# Patient Record
Sex: MALE | Race: WHITE | NOT HISPANIC OR LATINO | Employment: FULL TIME | URBAN - NONMETROPOLITAN AREA
[De-identification: names, ages, dates, MRNs, and addresses within clinical notes are randomized per-mention and may not be internally consistent; named-entity substitution may affect disease eponyms.]

---

## 2023-06-13 DIAGNOSIS — K21.9 GASTROESOPHAGEAL REFLUX DISEASE WITHOUT ESOPHAGITIS: Primary | ICD-10-CM

## 2023-06-13 RX ORDER — OMEPRAZOLE 40 MG/1
1 CAPSULE, DELAYED RELEASE ORAL DAILY
COMMUNITY
Start: 2020-03-04 | End: 2023-06-13 | Stop reason: SDUPTHER

## 2023-06-13 RX ORDER — OMEPRAZOLE 40 MG/1
40 CAPSULE, DELAYED RELEASE ORAL DAILY
Qty: 90 CAPSULE | Refills: 1 | Status: SHIPPED | OUTPATIENT
Start: 2023-06-13 | End: 2023-11-22

## 2023-07-25 ENCOUNTER — OFFICE VISIT (OUTPATIENT)
Dept: PRIMARY CARE | Facility: CLINIC | Age: 23
End: 2023-07-25
Payer: COMMERCIAL

## 2023-07-25 VITALS
OXYGEN SATURATION: 96 % | HEART RATE: 72 BPM | RESPIRATION RATE: 14 BRPM | SYSTOLIC BLOOD PRESSURE: 117 MMHG | DIASTOLIC BLOOD PRESSURE: 67 MMHG | BODY MASS INDEX: 27.94 KG/M2 | HEIGHT: 72 IN | TEMPERATURE: 97.8 F | WEIGHT: 206.3 LBS

## 2023-07-25 DIAGNOSIS — F84.0 AUTISM SPECTRUM DISORDER (HHS-HCC): ICD-10-CM

## 2023-07-25 DIAGNOSIS — F90.2 ATTENTION DEFICIT HYPERACTIVITY DISORDER (ADHD), COMBINED TYPE: ICD-10-CM

## 2023-07-25 DIAGNOSIS — J30.89 SEASONAL ALLERGIC RHINITIS DUE TO OTHER ALLERGIC TRIGGER: ICD-10-CM

## 2023-07-25 DIAGNOSIS — Z11.59 NEED FOR HEPATITIS C SCREENING TEST: ICD-10-CM

## 2023-07-25 DIAGNOSIS — K21.00 GASTROESOPHAGEAL REFLUX DISEASE WITH ESOPHAGITIS WITHOUT HEMORRHAGE: ICD-10-CM

## 2023-07-25 DIAGNOSIS — J45.40 MODERATE PERSISTENT ASTHMA WITHOUT COMPLICATION (HHS-HCC): ICD-10-CM

## 2023-07-25 DIAGNOSIS — Z11.4 ENCOUNTER FOR SCREENING FOR HIV: ICD-10-CM

## 2023-07-25 DIAGNOSIS — F41.9 ANXIETY: ICD-10-CM

## 2023-07-25 DIAGNOSIS — F95.9 TIC DISORDER: ICD-10-CM

## 2023-07-25 DIAGNOSIS — E78.2 MIXED HYPERLIPIDEMIA: ICD-10-CM

## 2023-07-25 DIAGNOSIS — Z00.00 WELLNESS EXAMINATION: Primary | ICD-10-CM

## 2023-07-25 DIAGNOSIS — G43.909 MIGRAINE WITHOUT STATUS MIGRAINOSUS, NOT INTRACTABLE, UNSPECIFIED MIGRAINE TYPE: ICD-10-CM

## 2023-07-25 PROBLEM — D68.51 HETEROZYGOUS FACTOR V LEIDEN MUTATION (MULTI): Status: ACTIVE | Noted: 2023-07-25

## 2023-07-25 PROBLEM — M41.9 SCOLIOSIS: Status: ACTIVE | Noted: 2023-07-25

## 2023-07-25 PROBLEM — E78.5 HYPERLIPIDEMIA: Status: ACTIVE | Noted: 2023-07-25

## 2023-07-25 PROBLEM — S61.219A FINGER LACERATION: Status: RESOLVED | Noted: 2023-07-25 | Resolved: 2023-07-25

## 2023-07-25 PROBLEM — L70.0 SUPERFICIAL ACNE VULGARIS: Status: ACTIVE | Noted: 2023-07-25

## 2023-07-25 PROBLEM — S61.219A FINGER LACERATION: Status: ACTIVE | Noted: 2023-07-25

## 2023-07-25 PROCEDURE — 1036F TOBACCO NON-USER: CPT | Performed by: FAMILY MEDICINE

## 2023-07-25 PROCEDURE — 99395 PREV VISIT EST AGE 18-39: CPT | Performed by: FAMILY MEDICINE

## 2023-07-25 RX ORDER — LEVOCETIRIZINE DIHYDROCHLORIDE 5 MG/1
5 TABLET, FILM COATED ORAL NIGHTLY
COMMUNITY

## 2023-07-25 RX ORDER — BUDESONIDE AND FORMOTEROL FUMARATE DIHYDRATE 80; 4.5 UG/1; UG/1
AEROSOL RESPIRATORY (INHALATION)
COMMUNITY
Start: 2021-04-19

## 2023-07-25 RX ORDER — LISDEXAMFETAMINE DIMESYLATE 70 MG/1
1 CAPSULE ORAL DAILY
COMMUNITY
Start: 2023-07-23 | End: 2024-01-04 | Stop reason: SDUPTHER

## 2023-07-25 RX ORDER — MONTELUKAST SODIUM 10 MG/1
10 TABLET ORAL DAILY
Qty: 30 TABLET | Refills: 11 | Status: SHIPPED | OUTPATIENT
Start: 2023-07-25

## 2023-07-25 RX ORDER — MAGNESIUM GLUCONATE 27 MG(500)
1 TABLET ORAL NIGHTLY
COMMUNITY
Start: 2017-08-14

## 2023-07-25 RX ORDER — EPINEPHRINE 0.3 MG/.3ML
INJECTION SUBCUTANEOUS
COMMUNITY
Start: 2017-05-30

## 2023-07-25 RX ORDER — ALBUTEROL SULFATE 90 UG/1
2 AEROSOL, METERED RESPIRATORY (INHALATION) EVERY 6 HOURS PRN
Qty: 18 G | Refills: 1 | Status: SHIPPED | OUTPATIENT
Start: 2023-07-25

## 2023-07-25 RX ORDER — SERTRALINE HYDROCHLORIDE 100 MG/1
300 TABLET, FILM COATED ORAL DAILY
COMMUNITY
End: 2024-01-10 | Stop reason: SDUPTHER

## 2023-07-25 RX ORDER — FAMOTIDINE 20 MG/1
20 TABLET, FILM COATED ORAL 2 TIMES DAILY
COMMUNITY
Start: 2017-09-12

## 2023-07-25 RX ORDER — CYPROHEPTADINE HYDROCHLORIDE 4 MG/1
4 TABLET ORAL 2 TIMES DAILY
Qty: 60 TABLET | Refills: 11 | Status: SHIPPED | OUTPATIENT
Start: 2023-07-25

## 2023-07-25 RX ORDER — LISDEXAMFETAMINE DIMESYLATE 50 MG/1
CAPSULE ORAL
COMMUNITY
Start: 2017-09-11 | End: 2024-01-10 | Stop reason: SDUPTHER

## 2023-07-25 RX ORDER — ALBUTEROL SULFATE 90 UG/1
AEROSOL, METERED RESPIRATORY (INHALATION)
COMMUNITY
Start: 2015-05-28 | End: 2023-07-25 | Stop reason: SDUPTHER

## 2023-07-25 RX ORDER — CYPROHEPTADINE HYDROCHLORIDE 4 MG/1
8 TABLET ORAL 2 TIMES DAILY
COMMUNITY
End: 2023-07-25 | Stop reason: SDUPTHER

## 2023-07-25 RX ORDER — CLONIDINE HYDROCHLORIDE 0.2 MG/1
0.2 TABLET ORAL 2 TIMES DAILY
COMMUNITY
End: 2024-03-28 | Stop reason: SDUPTHER

## 2023-07-25 RX ORDER — MONTELUKAST SODIUM 10 MG/1
TABLET ORAL
COMMUNITY
Start: 2017-08-29 | End: 2023-07-25 | Stop reason: SDUPTHER

## 2023-07-25 NOTE — PROGRESS NOTES
Subjective   Patient ID: Luis Armas is a 22 y.o. male who presents for annual physical/wellness visit.    He signed document informing that if problem issues also address at today's wellness visit that insurance may be appropriately billed so co-pay and deductible out of pocket expenses may occur.      Tdap: 06/29/2022  HIV screen:  Hepatitis C screen:  Hepatitis B vaccine:    Psychologist is leaving practice and advised mom PCP can take over meds.     Mom will be bringing back forms for [employer] .      HPI   Mother states that his current psychiatrist is leaving his practice and requests that I take over medication prescriptions. Currently taking Vyvanse 50 mg BID. He was previously taking 70 mg but has trouble finding 70 mg dose due to national shortage.     Patient takes Zoloft 300 mg daily  for anxiety and clonidine 0.2 mg BID for tics.     Patient takes cyproheptadine 4 mg BID for migraines. Migraines are no longer triggered by cocoa or citrus.     Patient occasionally uses albuterol inhaler. He typically needs it while playing sports.     Acid reflux is well controlled with omeprazole.    Follows with optometrist and dentist regularly.    He works at [employer] for over 1 year.    Review of Systems  constitutional: as per HPI  eyes:as per HPI  CV:as per HPI  Respiratory:as per HPI  GI:as per HPI  neuro:as per HPI  endo:as per HPI  heme/lymph:as per HPI     Objective   /67 (BP Location: Right arm, Patient Position: Sitting, BP Cuff Size: Adult)   Pulse 72   Temp 36.6 °C (97.8 °F) (Temporal)   Resp 14   Ht 1.829 m (6')   Wt 93.6 kg (206 lb 4.8 oz)   SpO2 96%   BMI 27.98 kg/m²     Physical Exam  Constitutional:       Appearance: Normal appearance. He is overweight.   HENT:      Head: Normocephalic.      Right Ear: Tympanic membrane, ear canal and external ear normal.      Left Ear: Tympanic membrane, ear canal and external ear normal.      Nose: Nose normal.      Mouth/Throat:       Mouth: Mucous membranes are moist.   Eyes:      Conjunctiva/sclera: Conjunctivae normal.      Pupils: Pupils are equal, round, and reactive to light.   Cardiovascular:      Rate and Rhythm: Normal rate and regular rhythm.   Pulmonary:      Effort: Pulmonary effort is normal.      Breath sounds: Normal breath sounds.   Abdominal:      General: Abdomen is flat. Bowel sounds are normal.      Palpations: Abdomen is soft.   Musculoskeletal:         General: Normal range of motion.   Skin:     General: Skin is warm.   Neurological:      General: No focal deficit present.      Mental Status: He is alert and oriented to person, place, and time.   Psychiatric:         Mood and Affect: Mood normal.         Behavior: Behavior normal.         Thought Content: Thought content normal.         Judgment: Judgment normal.         Assessment/Plan   Problem List Items Addressed This Visit          ENT    Allergic rhinitis     Stable.  Continue Singulair 10 mg daily and Xyzal 5 mg nightly.            Gastrointestinal and Abdominal    Gastroesophageal reflux disease     Stable.  Continue omeprazole 40 mg daily.    Take prescribed medication as written.  May take TUMS or Rolaids as needed.  No eating within 2 hours of going to bed.  May want to elevate the head of your bed.  Avoid caffeine, chocolate, alcohol, spicy foods, acidic foods, fried foods, mint flavoring.  Call or return to the office if your symptoms change,  worsen, or fail to improve.  It may take 2 weeks for the medication to take effect.              Health Encounters    Wellness examination - Primary     Ordered general wellness blood work.  Recommended to have flu vaccine in mid-September.            Mental Health    ADHD (attention deficit hyperactivity disorder)     Currently treating with Vyvanse 50 mg BID.   Previously was taking 70 mg BID, but unable to fill prescriptions due to national shortage.  Encouraged [place] check rural pharmacies [place] 70 mg dose.  I will be  "taking over prescription of Vyvanse after psychiatrist left practice.            Neuro    Migraine headache     Stable.  Continue Periactin 4 mg BID.  No longer sensitive to previous triggers of cocoa and citrus.         Relevant Medications    cyproheptadine (Periactin) 4 mg tablet    Tic disorder     Stable.  Continue clonidine 0.2 mg BID.            Pulmonary and Pneumonias    Moderate persistent asthma     Stable.  Continue Symbicort, Singulair, and albuterol inhaler.         Relevant Medications    albuterol (Ventolin HFA) 90 mcg/actuation inhaler    montelukast (Singulair) 10 mg tablet     Tips for your general wellness...;  Remember importance of daily aerobic exercise for 30minutes to help with both your physical and mental/emotional health.  ;  Take time twice a day to relax with focused breathing and relaxation.  A good source to learn \"mindfulness\" relaxation is a book \"Mindfulness for Beginners\" by Bassam Connell.  ;    Strive for healthy eating with plenty of water, fruits, vegetables, and choosing as much plant based diet as able  If do consume non plant protein, choose fish high in omega (like salmon or cod), skinless poultry, and rarely anything from a cow  Avoid processed foods.  ;  Shop the perimeter of the grocery store  - not the inner aisles where all the boxed, bagged, and canned process non natural foods are   Avoid sugar - including fruit juices with added sugar and avoid artificial sweeteners (sucralose, aspartame).; if want to use sweetener, use stevia (natural plant based non caloric sweetener)  Recommended guided nutrition plans include Mediterranean Diet - online resources available     Get plenty of sleep nightly - 7 hours minimum;    Exercise 150min per week;    Do not use tobacco    Abstain or limit alcohol to 1-2 drinks per 24 hours    See your dentist at least yearly    Have an eye check at least every 5 years    Follow up in 6 months.    By signing my name below Audi KEATING" sussy Marino, attest that this documentation has been prepared under the direction and in the presence of Dr. Ever Douglas. 07/25/23

## 2023-07-25 NOTE — ASSESSMENT & PLAN NOTE
Currently treating with Vyvanse 50 mg BID.   Previously was taking 70 mg am and 50mg 1pm, but unable to fill 70mg dose due to national shortage.  Encouraged [place] check rural pharmacies [place] 70 mg dose.  I will be taking over prescription of Vyvanse after psychiatrist left practice.

## 2023-07-25 NOTE — ASSESSMENT & PLAN NOTE
Stable.  Continue omeprazole 40 mg daily.    Take prescribed medication as written.  May take TUMS or Rolaids as needed.  No eating within 2 hours of going to bed.  May want to elevate the head of your bed.  Avoid caffeine, chocolate, alcohol, spicy foods, acidic foods, fried foods, mint flavoring.  Call or return to the office if your symptoms change,  worsen, or fail to improve.  It may take 2 weeks for the medication to take effect.

## 2023-07-25 NOTE — ASSESSMENT & PLAN NOTE
Stable.  Continue Periactin 4 mg BID.  No longer sensitive to previous triggers of cocoa and citrus.

## 2023-07-26 ENCOUNTER — TELEPHONE (OUTPATIENT)
Dept: PRIMARY CARE | Facility: CLINIC | Age: 23
End: 2023-07-26

## 2023-07-26 NOTE — TELEPHONE ENCOUNTER
Pt was seen on 7/25/23 for Wellness Exam. His mom just dropped off paperwork to be filled out for [employer]. Once form is completed you call Jenny at [phone].

## 2023-08-02 ENCOUNTER — LAB (OUTPATIENT)
Dept: LAB | Facility: LAB | Age: 23
End: 2023-08-02
Payer: COMMERCIAL

## 2023-08-02 DIAGNOSIS — Z11.4 ENCOUNTER FOR SCREENING FOR HIV: ICD-10-CM

## 2023-08-02 DIAGNOSIS — Z11.59 NEED FOR HEPATITIS C SCREENING TEST: ICD-10-CM

## 2023-08-02 DIAGNOSIS — Z00.00 WELLNESS EXAMINATION: ICD-10-CM

## 2023-08-02 LAB
ALANINE AMINOTRANSFERASE (SGPT) (U/L) IN SER/PLAS: 21 U/L (ref 10–52)
ALBUMIN (G/DL) IN SER/PLAS: 4.8 G/DL (ref 3.4–5)
ALKALINE PHOSPHATASE (U/L) IN SER/PLAS: 99 U/L (ref 33–120)
ANION GAP IN SER/PLAS: 15 MMOL/L (ref 10–20)
ASPARTATE AMINOTRANSFERASE (SGOT) (U/L) IN SER/PLAS: 20 U/L (ref 9–39)
BILIRUBIN TOTAL (MG/DL) IN SER/PLAS: 0.5 MG/DL (ref 0–1.2)
CALCIUM (MG/DL) IN SER/PLAS: 9.7 MG/DL (ref 8.6–10.6)
CARBON DIOXIDE, TOTAL (MMOL/L) IN SER/PLAS: 27 MMOL/L (ref 21–32)
CHLORIDE (MMOL/L) IN SER/PLAS: 105 MMOL/L (ref 98–107)
CHOLESTEROL (MG/DL) IN SER/PLAS: 179 MG/DL (ref 0–199)
CHOLESTEROL IN HDL (MG/DL) IN SER/PLAS: 29.5 MG/DL
CHOLESTEROL/HDL RATIO: 6.1
CREATININE (MG/DL) IN SER/PLAS: 0.76 MG/DL (ref 0.5–1.3)
ERYTHROCYTE DISTRIBUTION WIDTH (RATIO) BY AUTOMATED COUNT: 13.8 % (ref 11.5–14.5)
ERYTHROCYTE MEAN CORPUSCULAR HEMOGLOBIN CONCENTRATION (G/DL) BY AUTOMATED: 32.1 G/DL (ref 32–36)
ERYTHROCYTE MEAN CORPUSCULAR VOLUME (FL) BY AUTOMATED COUNT: 87 FL (ref 80–100)
ERYTHROCYTES (10*6/UL) IN BLOOD BY AUTOMATED COUNT: 6.02 X10E12/L (ref 4.5–5.9)
GFR MALE: >90 ML/MIN/1.73M2
GLUCOSE (MG/DL) IN SER/PLAS: 88 MG/DL (ref 74–99)
HEMATOCRIT (%) IN BLOOD BY AUTOMATED COUNT: 52.6 % (ref 41–52)
HEMOGLOBIN (G/DL) IN BLOOD: 16.9 G/DL (ref 13.5–17.5)
HEPATITIS C VIRUS AB PRESENCE IN SERUM: NONREACTIVE
HIV 1/ 2 AG/AB SCREEN: NONREACTIVE
LDL: 92 MG/DL (ref 0–119)
LEUKOCYTES (10*3/UL) IN BLOOD BY AUTOMATED COUNT: 6.4 X10E9/L (ref 4.4–11.3)
NON HDL CHOLESTEROL: 150 MG/DL (ref 0–149)
NRBC (PER 100 WBCS) BY AUTOMATED COUNT: 0 /100 WBC (ref 0–0)
PLATELETS (10*3/UL) IN BLOOD AUTOMATED COUNT: 182 X10E9/L (ref 150–450)
POTASSIUM (MMOL/L) IN SER/PLAS: 4.5 MMOL/L (ref 3.5–5.3)
PROTEIN TOTAL: 7.4 G/DL (ref 6.4–8.2)
SODIUM (MMOL/L) IN SER/PLAS: 142 MMOL/L (ref 136–145)
TRIGLYCERIDE (MG/DL) IN SER/PLAS: 287 MG/DL (ref 0–149)
UREA NITROGEN (MG/DL) IN SER/PLAS: 15 MG/DL (ref 6–23)
VLDL: 57 MG/DL (ref 0–40)

## 2023-08-02 PROCEDURE — 86803 HEPATITIS C AB TEST: CPT

## 2023-08-02 PROCEDURE — 36415 COLL VENOUS BLD VENIPUNCTURE: CPT

## 2023-08-02 PROCEDURE — 87389 HIV-1 AG W/HIV-1&-2 AB AG IA: CPT

## 2023-08-02 PROCEDURE — 85027 COMPLETE CBC AUTOMATED: CPT

## 2023-08-02 PROCEDURE — 80061 LIPID PANEL: CPT

## 2023-08-02 PROCEDURE — 80053 COMPREHEN METABOLIC PANEL: CPT

## 2023-11-22 DIAGNOSIS — K21.9 GASTROESOPHAGEAL REFLUX DISEASE WITHOUT ESOPHAGITIS: ICD-10-CM

## 2023-11-22 RX ORDER — OMEPRAZOLE 40 MG/1
40 CAPSULE, DELAYED RELEASE ORAL DAILY
Qty: 90 CAPSULE | Refills: 1 | Status: SHIPPED | OUTPATIENT
Start: 2023-11-22 | End: 2024-06-06

## 2024-01-04 DIAGNOSIS — F90.9 ATTENTION DEFICIT HYPERACTIVITY DISORDER (ADHD), UNSPECIFIED ADHD TYPE: Primary | ICD-10-CM

## 2024-01-04 RX ORDER — LISDEXAMFETAMINE DIMESYLATE 50 MG/1
50 CAPSULE ORAL
Qty: 30 CAPSULE | Refills: 0 | OUTPATIENT
Start: 2024-01-04

## 2024-01-04 RX ORDER — LISDEXAMFETAMINE DIMESYLATE 70 MG/1
70 CAPSULE ORAL DAILY
Qty: 30 CAPSULE | Refills: 0 | OUTPATIENT
Start: 2024-01-04

## 2024-01-04 RX ORDER — LISDEXAMFETAMINE DIMESYLATE 70 MG/1
70 CAPSULE ORAL DAILY
Qty: 30 CAPSULE | Refills: 0 | Status: SHIPPED | OUTPATIENT
Start: 2024-01-04 | End: 2024-02-06 | Stop reason: SDUPTHER

## 2024-01-04 NOTE — PROGRESS NOTES
Rf request had both the 50 mg and 70 mg vyvanse doses     Dr santillan s most recent note said 2  50 mgs were used because they had trouble finding the 70 mg dose     I have sent in just the 70 mg dose -   if not available then will need to send in 50 mg dose     Will need to clarify with pt/mom if the 50 mg was in fact 1 tab  daily or 2 tabs daily

## 2024-01-10 ENCOUNTER — OFFICE VISIT (OUTPATIENT)
Dept: PRIMARY CARE | Facility: CLINIC | Age: 24
End: 2024-01-10
Payer: COMMERCIAL

## 2024-01-10 ENCOUNTER — LAB (OUTPATIENT)
Dept: LAB | Facility: LAB | Age: 24
End: 2024-01-10
Payer: COMMERCIAL

## 2024-01-10 VITALS
DIASTOLIC BLOOD PRESSURE: 67 MMHG | BODY MASS INDEX: 29.25 KG/M2 | OXYGEN SATURATION: 96 % | RESPIRATION RATE: 12 BRPM | TEMPERATURE: 97.8 F | SYSTOLIC BLOOD PRESSURE: 112 MMHG | WEIGHT: 215.7 LBS | HEART RATE: 75 BPM

## 2024-01-10 DIAGNOSIS — E78.2 MIXED HYPERLIPIDEMIA: Primary | ICD-10-CM

## 2024-01-10 DIAGNOSIS — F41.9 ANXIETY: ICD-10-CM

## 2024-01-10 DIAGNOSIS — F90.0 ATTENTION DEFICIT HYPERACTIVITY DISORDER (ADHD), PREDOMINANTLY INATTENTIVE TYPE: ICD-10-CM

## 2024-01-10 DIAGNOSIS — K21.9 GASTROESOPHAGEAL REFLUX DISEASE WITHOUT ESOPHAGITIS: ICD-10-CM

## 2024-01-10 DIAGNOSIS — J45.40 MODERATE PERSISTENT ASTHMA WITHOUT COMPLICATION (HHS-HCC): ICD-10-CM

## 2024-01-10 DIAGNOSIS — Z79.899 MEDICATION MANAGEMENT: ICD-10-CM

## 2024-01-10 LAB
AMPHETAMINES UR QL SCN: ABNORMAL
BARBITURATES UR QL SCN: ABNORMAL
BENZODIAZ UR QL SCN: ABNORMAL
BZE UR QL SCN: ABNORMAL
CANNABINOIDS UR QL SCN: ABNORMAL
FENTANYL+NORFENTANYL UR QL SCN: ABNORMAL
OPIATES UR QL SCN: ABNORMAL
OXYCODONE+OXYMORPHONE UR QL SCN: ABNORMAL
PCP UR QL SCN: ABNORMAL

## 2024-01-10 PROCEDURE — 1036F TOBACCO NON-USER: CPT | Performed by: FAMILY MEDICINE

## 2024-01-10 PROCEDURE — 99214 OFFICE O/P EST MOD 30 MIN: CPT | Performed by: FAMILY MEDICINE

## 2024-01-10 PROCEDURE — 80307 DRUG TEST PRSMV CHEM ANLYZR: CPT

## 2024-01-10 PROCEDURE — 80324 DRUG SCREEN AMPHETAMINES 1/2: CPT

## 2024-01-10 RX ORDER — SERTRALINE HYDROCHLORIDE 100 MG/1
300 TABLET, FILM COATED ORAL DAILY
Qty: 270 TABLET | Refills: 3 | Status: SHIPPED | OUTPATIENT
Start: 2024-01-10

## 2024-01-10 RX ORDER — METHYLPHENIDATE HYDROCHLORIDE 20 MG/1
CAPSULE, EXTENDED RELEASE ORAL
COMMUNITY
Start: 2023-08-03 | End: 2024-01-10 | Stop reason: WASHOUT

## 2024-01-10 RX ORDER — LISDEXAMFETAMINE DIMESYLATE 50 MG/1
50 CAPSULE ORAL DAILY
Qty: 30 CAPSULE | Refills: 0 | Status: SHIPPED | OUTPATIENT
Start: 2024-01-10 | End: 2024-03-18 | Stop reason: SDUPTHER

## 2024-01-10 ASSESSMENT — PATIENT HEALTH QUESTIONNAIRE - PHQ9
1. LITTLE INTEREST OR PLEASURE IN DOING THINGS: NOT AT ALL
2. FEELING DOWN, DEPRESSED OR HOPELESS: NOT AT ALL
SUM OF ALL RESPONSES TO PHQ9 QUESTIONS 1 AND 2: 0

## 2024-01-10 ASSESSMENT — ENCOUNTER SYMPTOMS
DIARRHEA: 0
CONSTIPATION: 0
NAUSEA: 0
COUGH: 1
SHORTNESS OF BREATH: 0

## 2024-01-10 NOTE — PROGRESS NOTES
Subjective     Patient ID: Luis Armas is a 23 y.o. male who presents for follow up of chronic medical conditions.    Due UDS   Csa done 07/25/2023     With mom today     Overall he is going very well. Mom reports a problem at the pharmacy, they have been struggling to get his Vyvanse in 70 mg capsules. He has not needed to use his rescue inhaler recently. Mom also mentions that he has had a nagging cough ongoing for a couple weeks.   He has been working to cut out pop and caffeinated beverages, he is also trying to eat healthier.        Review of Systems   Respiratory:  Positive for cough. Negative for shortness of breath.    Gastrointestinal:  Negative for constipation, diarrhea and nausea.       Objective   /67 (BP Location: Left arm, Patient Position: Sitting, BP Cuff Size: Adult)   Pulse 75   Temp 36.6 °C (97.8 °F) (Temporal)   Resp 12   Wt 97.8 kg (215 lb 11.2 oz)   SpO2 96%   BMI 29.25 kg/m²   Physical Exam  Constitutional:       Appearance: Normal appearance.   Cardiovascular:      Rate and Rhythm: Normal rate and regular rhythm.      Pulses: Normal pulses.      Heart sounds: Normal heart sounds.   Pulmonary:      Effort: Pulmonary effort is normal.      Breath sounds: Normal breath sounds.   Neurological:      Mental Status: He is alert.         Assessment/Plan   Problem List Items Addressed This Visit       Gastroesophageal reflux disease without esophagitis     Stable.  Continue omeprazole 40 mg daily.    Stable/cpntrolled  Take prescribed medication as written.  May take TUMS or Rolaids as needed.  No eating within 2 hours of going to bed.  May want to elevate the head of your bed.  Avoid caffeine, chocolate, alcohol, spicy foods, acidic foods, fried foods, mint flavoring.  Call or return to the office if your symptoms change,  worsen, or fail to improve.  It may take 2 weeks for the medication to take effect.           Anxiety     Stable.  Continue Zoloft 100 mg TID.         Relevant  Medications    sertraline (Zoloft) 100 mg tablet    Attention deficit hyperactivity disorder (ADHD), predominantly inattentive type     Stable.  Currently treating with Vyvanse 70 mg in am and 50mg in afternoon  Needs UDS - order placed for today  Follow-up in 6 months.         Relevant Medications    Vyvanse 50 mg capsule    Mixed hyperlipidemia - Primary     Lab Results   Component Value Date    CHOL 179 08/02/2023    CHOL 256 (H) 12/06/2019    CHOL 190 11/01/2019     Lab Results   Component Value Date    HDL 29.5 (A) 08/02/2023     Lab Results   Component Value Date    TRIG 287 (H) 08/02/2023    TRIG 295 (H) 12/06/2019    TRIG 400 (H) 11/01/2019   Advised lower sugar and carb diet  He and mom report last 6 months has improved diet - no pop; no longer eating bbq (sauce)    Repeat lipid panel at 6 month visit for annual check         Moderate persistent asthma without complication     Stable.  Continue Symbicort, Singulair, and albuterol inhaler.          Other Visit Diagnoses       Medication management        Relevant Orders    Drug Screen, Urine With Reflex to Confirmation            Scribe Attestation  By signing my name below, I, Altagracia Leong , Scribe   attest that this documentation has been prepared under the direction and in the presence of Ever Douglas MD.

## 2024-01-10 NOTE — ASSESSMENT & PLAN NOTE
Stable.  Currently treating with Vyvanse 70 mg in am and 50mg in afternoon  Needs UDS - order placed for today  Follow-up in 6 months.

## 2024-01-10 NOTE — ASSESSMENT & PLAN NOTE
Lab Results   Component Value Date    CHOL 179 08/02/2023    CHOL 256 (H) 12/06/2019    CHOL 190 11/01/2019     Lab Results   Component Value Date    HDL 29.5 (A) 08/02/2023     Lab Results   Component Value Date    TRIG 287 (H) 08/02/2023    TRIG 295 (H) 12/06/2019    TRIG 400 (H) 11/01/2019     Advised lower sugar and carb diet  He and mom report last 6 months has improved diet - no pop; no longer eating bbq (sauce)    Repeat lipid panel at 6 month visit for annual check

## 2024-01-10 NOTE — ASSESSMENT & PLAN NOTE
Stable.  Continue omeprazole 40 mg daily.    Stable/cpntrolled  Take prescribed medication as written.  May take TUMS or Rolaids as needed.  No eating within 2 hours of going to bed.  May want to elevate the head of your bed.  Avoid caffeine, chocolate, alcohol, spicy foods, acidic foods, fried foods, mint flavoring.  Call or return to the office if your symptoms change,  worsen, or fail to improve.  It may take 2 weeks for the medication to take effect.

## 2024-01-14 LAB
AMPHET UR-MCNC: >5000 NG/ML
MDA UR-MCNC: <200 NG/ML
MDEA UR-MCNC: <200 NG/ML
MDMA UR-MCNC: <200 NG/ML
METHAMPHET UR-MCNC: <200 NG/ML
PHENTERMINE UR CFM-MCNC: <200 NG/ML

## 2024-02-06 DIAGNOSIS — F90.9 ATTENTION DEFICIT HYPERACTIVITY DISORDER (ADHD), UNSPECIFIED ADHD TYPE: ICD-10-CM

## 2024-02-06 RX ORDER — LISDEXAMFETAMINE DIMESYLATE 70 MG/1
70 CAPSULE ORAL DAILY
Qty: 30 CAPSULE | Refills: 0 | Status: SHIPPED | OUTPATIENT
Start: 2024-02-06 | End: 2024-03-18 | Stop reason: SDUPTHER

## 2024-03-18 DIAGNOSIS — F90.0 ATTENTION DEFICIT HYPERACTIVITY DISORDER (ADHD), PREDOMINANTLY INATTENTIVE TYPE: ICD-10-CM

## 2024-03-18 DIAGNOSIS — F90.9 ATTENTION DEFICIT HYPERACTIVITY DISORDER (ADHD), UNSPECIFIED ADHD TYPE: ICD-10-CM

## 2024-03-18 RX ORDER — LISDEXAMFETAMINE DIMESYLATE 70 MG/1
70 CAPSULE ORAL DAILY
Qty: 30 CAPSULE | Refills: 0 | Status: SHIPPED | OUTPATIENT
Start: 2024-03-18

## 2024-03-18 RX ORDER — LISDEXAMFETAMINE DIMESYLATE 50 MG/1
50 CAPSULE ORAL DAILY
Qty: 30 CAPSULE | Refills: 0 | Status: SHIPPED | OUTPATIENT
Start: 2024-03-18

## 2024-03-28 DIAGNOSIS — F90.0 ATTENTION DEFICIT HYPERACTIVITY DISORDER (ADHD), PREDOMINANTLY INATTENTIVE TYPE: Primary | ICD-10-CM

## 2024-03-28 RX ORDER — CLONIDINE HYDROCHLORIDE 0.2 MG/1
0.2 TABLET ORAL 2 TIMES DAILY
Qty: 180 TABLET | Refills: 1 | Status: SHIPPED | OUTPATIENT
Start: 2024-03-28

## 2024-04-22 DIAGNOSIS — F90.9 ATTENTION DEFICIT HYPERACTIVITY DISORDER (ADHD), UNSPECIFIED ADHD TYPE: ICD-10-CM

## 2024-04-22 DIAGNOSIS — F90.0 ATTENTION DEFICIT HYPERACTIVITY DISORDER (ADHD), PREDOMINANTLY INATTENTIVE TYPE: Primary | ICD-10-CM

## 2024-04-23 RX ORDER — LISDEXAMFETAMINE DIMESYLATE 70 MG/1
70 CAPSULE ORAL EVERY MORNING
Qty: 30 CAPSULE | Refills: 0 | Status: SHIPPED | OUTPATIENT
Start: 2024-06-21 | End: 2024-07-21

## 2024-04-23 RX ORDER — LISDEXAMFETAMINE DIMESYLATE 70 MG/1
70 CAPSULE ORAL EVERY MORNING
Qty: 30 CAPSULE | Refills: 0 | Status: SHIPPED | OUTPATIENT
Start: 2024-04-23 | End: 2024-05-23

## 2024-04-23 RX ORDER — LISDEXAMFETAMINE DIMESYLATE 70 MG/1
70 CAPSULE ORAL EVERY MORNING
Qty: 30 CAPSULE | Refills: 0 | Status: SHIPPED | OUTPATIENT
Start: 2024-05-22 | End: 2024-06-21

## 2024-06-06 DIAGNOSIS — K21.9 GASTROESOPHAGEAL REFLUX DISEASE WITHOUT ESOPHAGITIS: ICD-10-CM

## 2024-06-06 RX ORDER — OMEPRAZOLE 40 MG/1
40 CAPSULE, DELAYED RELEASE ORAL DAILY
Qty: 90 CAPSULE | Refills: 1 | Status: SHIPPED | OUTPATIENT
Start: 2024-06-06

## 2024-07-07 DIAGNOSIS — G43.909 MIGRAINE WITHOUT STATUS MIGRAINOSUS, NOT INTRACTABLE, UNSPECIFIED MIGRAINE TYPE: ICD-10-CM

## 2024-07-07 DIAGNOSIS — J45.40 MODERATE PERSISTENT ASTHMA WITHOUT COMPLICATION (HHS-HCC): ICD-10-CM

## 2024-07-08 RX ORDER — MONTELUKAST SODIUM 10 MG/1
10 TABLET ORAL DAILY
Qty: 30 TABLET | Refills: 11 | Status: SHIPPED | OUTPATIENT
Start: 2024-07-08

## 2024-07-08 RX ORDER — CYPROHEPTADINE HYDROCHLORIDE 4 MG/1
4 TABLET ORAL 2 TIMES DAILY
Qty: 60 TABLET | Refills: 11 | Status: SHIPPED | OUTPATIENT
Start: 2024-07-08

## 2024-07-10 ENCOUNTER — APPOINTMENT (OUTPATIENT)
Dept: PRIMARY CARE | Facility: CLINIC | Age: 24
End: 2024-07-10
Payer: COMMERCIAL

## 2024-07-10 VITALS
TEMPERATURE: 97.9 F | RESPIRATION RATE: 14 BRPM | DIASTOLIC BLOOD PRESSURE: 62 MMHG | WEIGHT: 212.4 LBS | OXYGEN SATURATION: 98 % | BODY MASS INDEX: 28.81 KG/M2 | SYSTOLIC BLOOD PRESSURE: 107 MMHG | HEART RATE: 67 BPM

## 2024-07-10 DIAGNOSIS — F90.9 ATTENTION DEFICIT HYPERACTIVITY DISORDER (ADHD), UNSPECIFIED ADHD TYPE: ICD-10-CM

## 2024-07-10 DIAGNOSIS — F41.9 ANXIETY: ICD-10-CM

## 2024-07-10 DIAGNOSIS — G43.809 OTHER MIGRAINE WITHOUT STATUS MIGRAINOSUS, NOT INTRACTABLE: ICD-10-CM

## 2024-07-10 DIAGNOSIS — J45.40 MODERATE PERSISTENT ASTHMA WITHOUT COMPLICATION (HHS-HCC): ICD-10-CM

## 2024-07-10 DIAGNOSIS — E78.2 MIXED HYPERLIPIDEMIA: Primary | ICD-10-CM

## 2024-07-10 DIAGNOSIS — J30.89 SEASONAL ALLERGIC RHINITIS DUE TO OTHER ALLERGIC TRIGGER: ICD-10-CM

## 2024-07-10 DIAGNOSIS — F90.0 ATTENTION DEFICIT HYPERACTIVITY DISORDER (ADHD), PREDOMINANTLY INATTENTIVE TYPE: ICD-10-CM

## 2024-07-10 DIAGNOSIS — K21.9 GASTROESOPHAGEAL REFLUX DISEASE WITHOUT ESOPHAGITIS: ICD-10-CM

## 2024-07-10 PROCEDURE — 99214 OFFICE O/P EST MOD 30 MIN: CPT | Performed by: FAMILY MEDICINE

## 2024-07-10 RX ORDER — LISDEXAMFETAMINE DIMESYLATE 70 MG/1
70 CAPSULE ORAL DAILY
Qty: 30 CAPSULE | Refills: 0 | Status: SHIPPED | OUTPATIENT
Start: 2024-07-10

## 2024-07-10 RX ORDER — LISDEXAMFETAMINE DIMESYLATE 70 MG/1
70 CAPSULE ORAL EVERY MORNING
Qty: 30 CAPSULE | Refills: 0 | Status: SHIPPED | OUTPATIENT
Start: 2024-08-10 | End: 2024-09-09

## 2024-07-10 RX ORDER — OMEPRAZOLE 40 MG/1
40 CAPSULE, DELAYED RELEASE ORAL DAILY
Qty: 90 CAPSULE | Refills: 1 | Status: SHIPPED | OUTPATIENT
Start: 2024-07-10

## 2024-07-10 ASSESSMENT — ENCOUNTER SYMPTOMS
HEADACHES: 0
PALPITATIONS: 0
DIARRHEA: 0
NAUSEA: 0
ABDOMINAL PAIN: 0
CONSTIPATION: 0
SHORTNESS OF BREATH: 0
WHEEZING: 0

## 2024-07-10 ASSESSMENT — PATIENT HEALTH QUESTIONNAIRE - PHQ9
SUM OF ALL RESPONSES TO PHQ9 QUESTIONS 1 AND 2: 0
1. LITTLE INTEREST OR PLEASURE IN DOING THINGS: NOT AT ALL
2. FEELING DOWN, DEPRESSED OR HOPELESS: NOT AT ALL

## 2024-07-10 NOTE — ASSESSMENT & PLAN NOTE
Stable.  Continue omeprazole 40 mg daily.    Take prescribed medication as written.  May take TUMS or Rolaids as needed.  No eating within 2 hours of going to bed.  May want to elevate the head of your bed.  Avoid caffeine, chocolate, alcohol, spicy foods, acidic foods, fried foods, mint flavoring.

## 2024-07-10 NOTE — ASSESSMENT & PLAN NOTE
Lab Results   Component Value Date    CHOL 179 08/02/2023    CHOL 256 (H) 12/06/2019    CHOL 190 11/01/2019     Lab Results   Component Value Date    HDL 29.5 (A) 08/02/2023     Lab Results   Component Value Date    TRIG 287 (H) 08/02/2023    TRIG 295 (H) 12/06/2019    TRIG 400 (H) 11/01/2019   Stable.  Advised to continue working on healthy lifestyle changes.  Has cut out most snacks and pop.  Repeat lipid panel ordered.

## 2024-07-10 NOTE — PROGRESS NOTES
Subjective     Patient ID: Luis Armas is a 23 y.o. male who presents for follow up of chronic medical conditions.    With mom today.  CSA Due.  UDS was done 01/10/2024     Per patient everything is going okay no concerns.     OARRS:  No data recorded  I have personally reviewed the OARRS report for Luis Armas. I have considered the risks of abuse, dependence, addiction and diversion    Is the patient prescribed a combination of a benzodiazepine and opioid?  No    Last Urine Drug Screen / ordered today: Yes  Recent Results (from the past 8760 hour(s))  -Amphetamine Confirm, Urine:   Collection Time: 01/10/24  8:37 AM       Result                      Value             Ref Range           Methamphetamine Quant,*     <200              ng/mL               MDA, Urine                  <200              ng/mL               MDEA, Urine                 <200              ng/mL               Phentermine,Urine           <200              ng/mL               Amphetamines,Urine          >5000             ng/mL               MDMA, Urine                 <200              ng/mL          -Drug Screen, Urine With Reflex to Confirmation:   Collection Time: 01/10/24  8:37 AM       Result                      Value             Ref Range           Amphetamine Screen, Ur*                       Presumptive *   Presumptive Positive (A)       Barbiturate Screen, Ur*                       Presumptive *   Presumptive Negative       Benzodiazepines Screen*                       Presumptive *   Presumptive Negative       Cannabinoid Screen, Ur*                       Presumptive *   Presumptive Negative       Cocaine Metabolite Scr*                       Presumptive *   Presumptive Negative       Fentanyl Screen, Urine                        Presumptive *   Presumptive Negative       Opiate Screen, Urine                          Presumptive *   Presumptive Negative       Oxycodone Screen, Urine                       Presumptive  *   Presumptive Negative       PCP Screen, Urine                             Presumptive *   Presumptive Negative  Results are as expected.         Controlled Substance Agreement:  Date of the Last Agreement: this visit  Reviewed Controlled Substance Agreement including but not limited to the benefits, risks, and alternatives to treatment with a Controlled Substance medication(s).    Stimulants:   What is the patient's goal of therapy? Work focus  Is this being achieved with current treatment? yes    Activities of Daily Living:   Is your overall impression that this patient is benefiting (symptom reduction outweighs side effects) from stimulant therapy? Yes             He is focusing more on losing weight, he is down several pounds since his last appointment. The biggest change he has made is cutting out pop and most snacks. 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  His asthma is well controlled, he does not need to use inhalers on a daily basis.         Review of Systems   Respiratory:  Negative for shortness of breath and wheezing.    Cardiovascular:  Negative for chest pain and palpitations.   Gastrointestinal:  Negative for abdominal pain, constipation, diarrhea and nausea.   Neurological:  Negative for headaches.       Objective   /62 (BP Location: Left arm, Patient Position: Sitting, BP Cuff Size: Adult)   Pulse 67   Temp 36.6 °C (97.9 °F) (Temporal)   Resp 14   Wt 96.3 kg (212 lb 6.4 oz)   SpO2 98%   BMI 28.81 kg/m²     Physical Exam  Constitutional:       Appearance: Normal appearance. He is obese.   HENT:      Head: Normocephalic.      Right Ear: Tympanic membrane normal.      Left Ear: Tympanic membrane normal.      Nose: Nose normal.      Mouth/Throat:      Pharynx: Oropharynx is clear.   Eyes:      Conjunctiva/sclera: Conjunctivae normal.   Cardiovascular:      Rate and Rhythm: Normal rate and regular rhythm.       Pulses: Normal pulses.      Heart sounds: Normal heart sounds.   Pulmonary:      Effort: Pulmonary effort is normal.      Breath sounds: Normal breath sounds.   Abdominal:      General: Abdomen is flat. Bowel sounds are normal.      Palpations: Abdomen is soft.   Musculoskeletal:      Cervical back: Normal range of motion.   Neurological:      General: No focal deficit present.      Mental Status: He is alert.   Psychiatric:         Mood and Affect: Mood normal.         Behavior: Behavior normal.         Thought Content: Thought content normal.         Judgment: Judgment normal.         Assessment/Plan   Problem List Items Addressed This Visit       Gastroesophageal reflux disease without esophagitis     Stable.  Continue omeprazole 40 mg daily.    Take prescribed medication as written.  May take TUMS or Rolaids as needed.  No eating within 2 hours of going to bed.  May want to elevate the head of your bed.  Avoid caffeine, chocolate, alcohol, spicy foods, acidic foods, fried foods, mint flavoring.         Allergic rhinitis    Anxiety     Stable.  Continue Zoloft 100 mg TID.         Attention deficit hyperactivity disorder (ADHD), predominantly inattentive type     Stable.  Currently treating with Vyvanse 70 mg in am and 50mg in afternoon         Mixed hyperlipidemia - Primary     Lab Results   Component Value Date    CHOL 179 08/02/2023    CHOL 256 (H) 12/06/2019    CHOL 190 11/01/2019     Lab Results   Component Value Date    HDL 29.5 (A) 08/02/2023     Lab Results   Component Value Date    TRIG 287 (H) 08/02/2023    TRIG 295 (H) 12/06/2019    TRIG 400 (H) 11/01/2019   Stable.  Advised to continue working on healthy lifestyle changes.  Has cut out most snacks and pop.  Repeat lipid panel ordered.         Migraine headache    Moderate persistent asthma without complication ([facility])     Stable.  Continue Symbicort, Singulair, and albuterol inhaler.          Other Visit Diagnoses       Attention deficit  hyperactivity disorder (ADHD), unspecified ADHD type              Follow up: Scheduled 1/15/2025    Scribe Attestation  By signing my name below, I, Jeremy Moore   attest that this documentation has been prepared under the direction and in the presence of Ever Douglas MD.

## 2024-08-20 ENCOUNTER — TELEPHONE (OUTPATIENT)
Dept: PRIMARY CARE | Facility: CLINIC | Age: 24
End: 2024-08-20
Payer: COMMERCIAL

## 2024-09-05 DIAGNOSIS — F90.0 ATTENTION DEFICIT HYPERACTIVITY DISORDER (ADHD), PREDOMINANTLY INATTENTIVE TYPE: ICD-10-CM

## 2024-09-05 RX ORDER — CLONIDINE HYDROCHLORIDE 0.2 MG/1
0.2 TABLET ORAL 2 TIMES DAILY
Qty: 180 TABLET | Refills: 1 | Status: SHIPPED | OUTPATIENT
Start: 2024-09-05

## 2024-09-25 ENCOUNTER — TELEPHONE (OUTPATIENT)
Dept: PRIMARY CARE | Facility: CLINIC | Age: 24
End: 2024-09-25
Payer: COMMERCIAL

## 2024-09-26 NOTE — TELEPHONE ENCOUNTER
CaseId:[id number];Status:Approved;Review Type:Prior Auth;Coverage Start Date:08/26/2024;Coverage End Date:09/25/2025;

## 2024-10-17 ENCOUNTER — LAB (OUTPATIENT)
Dept: LAB | Facility: LAB | Age: 24
End: 2024-10-17
Payer: COMMERCIAL

## 2024-10-17 DIAGNOSIS — E78.2 MIXED HYPERLIPIDEMIA: ICD-10-CM

## 2024-10-17 LAB
CHOLEST SERPL-MCNC: 203 MG/DL (ref 0–199)
CHOLESTEROL/HDL RATIO: 6.9
HDLC SERPL-MCNC: 29.3 MG/DL
LDLC SERPL CALC-MCNC: 120 MG/DL
NON HDL CHOLESTEROL: 174 MG/DL (ref 0–149)
TRIGL SERPL-MCNC: 270 MG/DL (ref 0–149)
VLDL: 54 MG/DL (ref 0–40)

## 2024-10-17 PROCEDURE — 36415 COLL VENOUS BLD VENIPUNCTURE: CPT

## 2024-10-17 PROCEDURE — 80061 LIPID PANEL: CPT

## 2024-12-19 ENCOUNTER — TELEPHONE (OUTPATIENT)
Dept: PRIMARY CARE | Facility: CLINIC | Age: 24
End: 2024-12-19
Payer: COMMERCIAL

## 2024-12-19 DIAGNOSIS — F90.0 ATTENTION DEFICIT HYPERACTIVITY DISORDER (ADHD), PREDOMINANTLY INATTENTIVE TYPE: ICD-10-CM

## 2024-12-19 DIAGNOSIS — Z20.828 EXPOSURE TO INFLUENZA: Primary | ICD-10-CM

## 2024-12-19 RX ORDER — OSELTAMIVIR PHOSPHATE 75 MG/1
75 CAPSULE ORAL DAILY
Qty: 14 CAPSULE | Refills: 0 | Status: SHIPPED | OUTPATIENT
Start: 2024-12-19 | End: 2025-01-02

## 2024-12-19 NOTE — TELEPHONE ENCOUNTER
Pt's mom tested positive for influenza A. Per message in mom's chart you were going to call in rx for tamiflu. Pt uses [facility] in [facility].

## 2024-12-21 ENCOUNTER — TELEPHONE (OUTPATIENT)
Dept: PRIMARY CARE | Facility: CLINIC | Age: 24
End: 2024-12-21
Payer: COMMERCIAL

## 2024-12-21 NOTE — TELEPHONE ENCOUNTER
floresita Armas (proxy for Luis Armas)  [facility] Clinical Support Staff (supporting Ever Douglas MD)24 minutes ago (10:42 AM)       Morning! We didn’t realize Luis has no prescriptions left for his 70 mg vyanse. He doesn’t have another appointment until January with you. Would one be able to be called in before Merna?      Thanks

## 2024-12-23 RX ORDER — LISDEXAMFETAMINE DIMESYLATE 70 MG/1
70 CAPSULE ORAL EVERY MORNING
Qty: 30 CAPSULE | Refills: 0 | Status: SHIPPED | OUTPATIENT
Start: 2024-12-23 | End: 2025-01-22

## 2025-01-15 ENCOUNTER — APPOINTMENT (OUTPATIENT)
Dept: PRIMARY CARE | Facility: CLINIC | Age: 25
End: 2025-01-15
Payer: COMMERCIAL

## 2025-01-15 VITALS
HEART RATE: 81 BPM | BODY MASS INDEX: 30.37 KG/M2 | HEIGHT: 72 IN | SYSTOLIC BLOOD PRESSURE: 105 MMHG | OXYGEN SATURATION: 97 % | DIASTOLIC BLOOD PRESSURE: 63 MMHG | TEMPERATURE: 97.1 F | RESPIRATION RATE: 14 BRPM | WEIGHT: 224.2 LBS

## 2025-01-15 DIAGNOSIS — K21.9 GASTROESOPHAGEAL REFLUX DISEASE WITHOUT ESOPHAGITIS: ICD-10-CM

## 2025-01-15 DIAGNOSIS — E55.9 VITAMIN D DEFICIENCY: ICD-10-CM

## 2025-01-15 DIAGNOSIS — L30.9 DERMATITIS: ICD-10-CM

## 2025-01-15 DIAGNOSIS — D68.51 HETEROZYGOUS FACTOR V LEIDEN MUTATION (MULTI): ICD-10-CM

## 2025-01-15 DIAGNOSIS — G80.9 CEREBRAL PALSY, UNSPECIFIED TYPE (MULTI): ICD-10-CM

## 2025-01-15 DIAGNOSIS — E78.2 MIXED HYPERLIPIDEMIA: Primary | ICD-10-CM

## 2025-01-15 DIAGNOSIS — F84.0 AUTISM SPECTRUM DISORDER (HHS-HCC): ICD-10-CM

## 2025-01-15 DIAGNOSIS — Z00.00 WELLNESS EXAMINATION: ICD-10-CM

## 2025-01-15 DIAGNOSIS — F90.0 ATTENTION DEFICIT HYPERACTIVITY DISORDER (ADHD), PREDOMINANTLY INATTENTIVE TYPE: ICD-10-CM

## 2025-01-15 DIAGNOSIS — J45.40 MODERATE PERSISTENT ASTHMA WITHOUT COMPLICATION (HHS-HCC): ICD-10-CM

## 2025-01-15 DIAGNOSIS — Z79.899 MEDICATION MANAGEMENT: ICD-10-CM

## 2025-01-15 DIAGNOSIS — F41.9 ANXIETY: ICD-10-CM

## 2025-01-15 PROCEDURE — 3008F BODY MASS INDEX DOCD: CPT | Performed by: FAMILY MEDICINE

## 2025-01-15 PROCEDURE — 1036F TOBACCO NON-USER: CPT | Performed by: FAMILY MEDICINE

## 2025-01-15 PROCEDURE — 99214 OFFICE O/P EST MOD 30 MIN: CPT | Performed by: FAMILY MEDICINE

## 2025-01-15 RX ORDER — TRIAMCINOLONE ACETONIDE 5 MG/G
CREAM TOPICAL 2 TIMES DAILY
Qty: 15 G | Refills: 0 | Status: SHIPPED | OUTPATIENT
Start: 2025-01-15

## 2025-01-15 ASSESSMENT — PATIENT HEALTH QUESTIONNAIRE - PHQ9
7. TROUBLE CONCENTRATING ON THINGS, SUCH AS READING THE NEWSPAPER OR WATCHING TELEVISION: NOT AT ALL
6. FEELING BAD ABOUT YOURSELF - OR THAT YOU ARE A FAILURE OR HAVE LET YOURSELF OR YOUR FAMILY DOWN: NOT AT ALL
1. LITTLE INTEREST OR PLEASURE IN DOING THINGS: NOT AT ALL
SUM OF ALL RESPONSES TO PHQ QUESTIONS 1-9: 1
2. FEELING DOWN, DEPRESSED OR HOPELESS: NOT AT ALL
9. THOUGHTS THAT YOU WOULD BE BETTER OFF DEAD, OR OF HURTING YOURSELF: NOT AT ALL
3. TROUBLE FALLING OR STAYING ASLEEP OR SLEEPING TOO MUCH: SEVERAL DAYS
5. POOR APPETITE OR OVEREATING: NOT AT ALL
10. IF YOU CHECKED OFF ANY PROBLEMS, HOW DIFFICULT HAVE THESE PROBLEMS MADE IT FOR YOU TO DO YOUR WORK, TAKE CARE OF THINGS AT HOME, OR GET ALONG WITH OTHER PEOPLE: NOT DIFFICULT AT ALL
SUM OF ALL RESPONSES TO PHQ9 QUESTIONS 1 AND 2: 0
4. FEELING TIRED OR HAVING LITTLE ENERGY: NOT AT ALL
8. MOVING OR SPEAKING SO SLOWLY THAT OTHER PEOPLE COULD HAVE NOTICED. OR THE OPPOSITE, BEING SO FIGETY OR RESTLESS THAT YOU HAVE BEEN MOVING AROUND A LOT MORE THAN USUAL: NOT AT ALL

## 2025-01-15 ASSESSMENT — ENCOUNTER SYMPTOMS
RESPIRATORY NEGATIVE: 1
CARDIOVASCULAR NEGATIVE: 1
PSYCHIATRIC NEGATIVE: 1
GASTROINTESTINAL NEGATIVE: 1
MUSCULOSKELETAL NEGATIVE: 1
NEUROLOGICAL NEGATIVE: 1

## 2025-01-15 ASSESSMENT — ANXIETY QUESTIONNAIRES
IF YOU CHECKED OFF ANY PROBLEMS ON THIS QUESTIONNAIRE, HOW DIFFICULT HAVE THESE PROBLEMS MADE IT FOR YOU TO DO YOUR WORK, TAKE CARE OF THINGS AT HOME, OR GET ALONG WITH OTHER PEOPLE: NOT DIFFICULT AT ALL
4. TROUBLE RELAXING: NOT AT ALL
5. BEING SO RESTLESS THAT IT IS HARD TO SIT STILL: NOT AT ALL
GAD7 TOTAL SCORE: 3
1. FEELING NERVOUS, ANXIOUS, OR ON EDGE: SEVERAL DAYS
7. FEELING AFRAID AS IF SOMETHING AWFUL MIGHT HAPPEN: NOT AT ALL
2. NOT BEING ABLE TO STOP OR CONTROL WORRYING: SEVERAL DAYS
6. BECOMING EASILY ANNOYED OR IRRITABLE: NOT AT ALL
3. WORRYING TOO MUCH ABOUT DIFFERENT THINGS: SEVERAL DAYS

## 2025-01-15 NOTE — ASSESSMENT & PLAN NOTE
ATB started for UTI, Pt with bright affect. Continues discharge focused and expressing desire to DC to [facility], asked pt if he didn't get into the house upon discharge where would he go. Says he would stay at [facility] for the night.   No specific treatment indicated  If long air or car travel, get up and walk every 2 hours.

## 2025-01-15 NOTE — ASSESSMENT & PLAN NOTE
Mom reports some increase in impulsive behavior issues  Used to see behavioral counseling - it has been 2-3 years  Re[address]  If this does not work out, let me know

## 2025-01-15 NOTE — PROGRESS NOTES
Subjective   Patient ID: Luis Armas is a 24 y.o. male who presents for Follow-up (Pt is accompanied by mother/Pt would like to have spot above left eye (x approx 1 month) examined).    He has had a red spot above his left eye for about a month. He denies any injury that would have caused it. Mom states it looked worse when he first noticed it. He has no other spots like this. He denies changes to any soaps or detergents.   Mother would like to establish with a behavioral health specialist for him. They did go through [facility] previously but she was not happy with the experience overall.   Very rarely, maybe once per month, he will need to take Tums for acid reflux. He is still taking omeprazole daily.   His asthma and breathing continue to be well controlled on his current medications. He is active in several sports including basketball and golf.          Review of Systems   HENT: Negative.     Respiratory: Negative.     Cardiovascular: Negative.    Gastrointestinal: Negative.    Genitourinary: Negative.    Musculoskeletal: Negative.    Skin:         Red spot above L eye   Neurological: Negative.    Psychiatric/Behavioral: Negative.         Objective   /63 (BP Location: Right arm, Patient Position: Sitting, BP Cuff Size: Adult)   Pulse 81   Temp 36.2 °C (97.1 °F) (Temporal)   Resp 14   Ht 1.829 m (6')   Wt 102 kg (224 lb 3.2 oz)   SpO2 97%   BMI 30.41 kg/m²     Physical Exam  Constitutional:       Appearance: Normal appearance. He is obese.   HENT:      Head: Normocephalic.   Eyes:      Conjunctiva/sclera: Conjunctivae normal.   Cardiovascular:      Rate and Rhythm: Normal rate and regular rhythm.      Pulses: Normal pulses.      Heart sounds: Normal heart sounds.   Pulmonary:      Effort: Pulmonary effort is normal.      Breath sounds: Normal breath sounds.   Musculoskeletal:      Cervical back: Normal range of motion.   Skin:     Comments: Small spot above L eye, eczema in appearance    Neurological:      General: No focal deficit present.      Mental Status: He is alert.   Psychiatric:         Mood and Affect: Mood normal.         Behavior: Behavior normal.         Thought Content: Thought content normal.         Judgment: Judgment normal.         Assessment/Plan   Problem List Items Addressed This Visit       Gastroesophageal reflux disease without esophagitis     Stable.  Continue omeprazole 40 mg daily.    Take prescribed medication as written.  May take TUMS or Rolaids as needed.  No eating within 2 hours of going to bed.  May want to elevate the head of your bed.  Avoid caffeine, chocolate, alcohol, spicy foods, acidic foods, fried foods, mint flavoring.         Anxiety     Stable.  Continue Zoloft 100 mg TID.         Attention deficit hyperactivity disorder (ADHD), predominantly inattentive type     Stable.  Currently treating with Vyvanse 70 mg in am and 50mg in afternoon         Heterozygous factor V Leiden mutation (Multi)    Mixed hyperlipidemia - Primary     Lab Results   Component Value Date    CHOL 203 (H) 10/17/2024    CHOL 179 08/02/2023    CHOL 256 (H) 12/06/2019     Lab Results   Component Value Date    HDL 29.3 10/17/2024    HDL 29.5 (A) 08/02/2023     Lab Results   Component Value Date    LDLCALC 120 (H) 10/17/2024     Lab Results   Component Value Date    TRIG 270 (H) 10/17/2024    TRIG 287 (H) 08/02/2023    TRIG 295 (H) 12/06/2019   Stable.  Advised to continue working on healthy lifestyle changes.  Has cut out most snacks and pop.         Moderate persistent asthma without complication (HHS-HCC)     Stable.  Continue Symbicort, Singulair, and albuterol inhaler.         Wellness examination    Relevant Orders    CBC    Lipid Panel    Comprehensive Metabolic Panel     Other Visit Diagnoses       Vitamin D deficiency        Relevant Orders    Vitamin D 25-Hydroxy,Total (for eval of Vitamin D levels)        Form completed for [employer] Clearance given.    Follow up:  Scheduled 7/16/2025    Scribe Attestation  By signing my name below, I, Jeremy Moore   attest that this documentation has been prepared under the direction and in the presence of Ever Douglas MD.

## 2025-01-15 NOTE — ASSESSMENT & PLAN NOTE
Participates in special [employer]  Golf, basketball    Clearance form completed and signed today  No referral or restrictions

## 2025-01-15 NOTE — ASSESSMENT & PLAN NOTE
Lab Results   Component Value Date    CHOL 203 (H) 10/17/2024    CHOL 179 08/02/2023    CHOL 256 (H) 12/06/2019     Lab Results   Component Value Date    HDL 29.3 10/17/2024    HDL 29.5 (A) 08/02/2023     Lab Results   Component Value Date    LDLCALC 120 (H) 10/17/2024     Lab Results   Component Value Date    TRIG 270 (H) 10/17/2024    TRIG 287 (H) 08/02/2023    TRIG 295 (H) 12/06/2019   Stable.  Advised to continue working on healthy lifestyle changes.  Has cut out most snacks and pop.

## 2025-01-29 DIAGNOSIS — F90.0 ATTENTION DEFICIT HYPERACTIVITY DISORDER (ADHD), PREDOMINANTLY INATTENTIVE TYPE: ICD-10-CM

## 2025-01-29 RX ORDER — LISDEXAMFETAMINE DIMESYLATE 70 MG/1
70 CAPSULE ORAL EVERY MORNING
Qty: 30 CAPSULE | Refills: 0 | Status: SHIPPED | OUTPATIENT
Start: 2025-01-29 | End: 2025-02-28

## 2025-04-01 DIAGNOSIS — J30.89 SEASONAL ALLERGIC RHINITIS DUE TO OTHER ALLERGIC TRIGGER: ICD-10-CM

## 2025-04-01 DIAGNOSIS — F90.0 ATTENTION DEFICIT HYPERACTIVITY DISORDER (ADHD), PREDOMINANTLY INATTENTIVE TYPE: ICD-10-CM

## 2025-04-01 RX ORDER — CLONIDINE HYDROCHLORIDE 0.2 MG/1
0.2 TABLET ORAL 2 TIMES DAILY
Qty: 180 TABLET | Refills: 1 | Status: SHIPPED | OUTPATIENT
Start: 2025-04-01

## 2025-04-01 RX ORDER — LEVOCETIRIZINE DIHYDROCHLORIDE 5 MG/1
5 TABLET, FILM COATED ORAL NIGHTLY
Qty: 90 TABLET | Refills: 1 | Status: SHIPPED | OUTPATIENT
Start: 2025-04-01

## 2025-04-01 NOTE — TELEPHONE ENCOUNTER
Jenny Armas[address] [place] [address] Clinical Support Staff  Phone Number: [phone]     Good Morning!    Would it be possible to send in prescriptions for Luis Clonidine  (twice a day, .2 mg) and Xyzal (once a day, 5 mg). He has no refills left but his next appt isn't until July.    Thank you!

## 2025-05-12 DIAGNOSIS — F41.9 ANXIETY: ICD-10-CM

## 2025-05-12 RX ORDER — SERTRALINE HYDROCHLORIDE 100 MG/1
300 TABLET, FILM COATED ORAL DAILY
Qty: 270 TABLET | Refills: 1 | Status: SHIPPED | OUTPATIENT
Start: 2025-05-12

## 2025-05-12 NOTE — TELEPHONE ENCOUNTER
Pawel (proxy for Luis Armas) david [email or web address] [facility] Clinical Support Staff (supporting Ever Douglas MD) (Selected Message)      5/9/25  1:38 PM  Good Afternoon!     I was wondering if a prescription for my Zoloft could be send to the pharmacy. My pharmacy did change a couple months ago. It is [facility] ([place])   [phone].

## 2025-05-23 DIAGNOSIS — F90.0 ATTENTION DEFICIT HYPERACTIVITY DISORDER (ADHD), PREDOMINANTLY INATTENTIVE TYPE: ICD-10-CM

## 2025-05-26 RX ORDER — LISDEXAMFETAMINE DIMESYLATE 70 MG/1
70 CAPSULE ORAL EVERY MORNING
Qty: 30 CAPSULE | Refills: 0 | Status: SHIPPED | OUTPATIENT
Start: 2025-05-26 | End: 2025-06-25

## 2025-05-27 RX ORDER — LISDEXAMFETAMINE DIMESYLATE 70 MG/1
70 CAPSULE ORAL EVERY MORNING
Qty: 30 CAPSULE | Refills: 0 | OUTPATIENT
Start: 2025-05-27 | End: 2025-06-26

## 2025-05-27 NOTE — TELEPHONE ENCOUNTER
It looks like this medication was just sent to the pharmacy yesterday for a 2 month supply. Please advise.

## 2025-05-28 LAB
25(OH)D3+25(OH)D2 SERPL-MCNC: 30 NG/ML (ref 30–100)
ALBUMIN SERPL-MCNC: 4.9 G/DL (ref 3.6–5.1)
ALP SERPL-CCNC: 89 U/L (ref 36–130)
ALT SERPL-CCNC: 38 U/L (ref 9–46)
ANION GAP SERPL CALCULATED.4IONS-SCNC: 11 MMOL/L (CALC) (ref 7–17)
AST SERPL-CCNC: 26 U/L (ref 10–40)
BILIRUB SERPL-MCNC: 0.6 MG/DL (ref 0.2–1.2)
BUN SERPL-MCNC: 22 MG/DL (ref 7–25)
CALCIUM SERPL-MCNC: 9.7 MG/DL (ref 8.6–10.3)
CHLORIDE SERPL-SCNC: 103 MMOL/L (ref 98–110)
CHOLEST SERPL-MCNC: 199 MG/DL
CHOLEST/HDLC SERPL: 6.2 (CALC)
CO2 SERPL-SCNC: 26 MMOL/L (ref 20–32)
CREAT SERPL-MCNC: 0.83 MG/DL (ref 0.6–1.24)
EGFRCR SERPLBLD CKD-EPI 2021: 127 ML/MIN/1.73M2
ERYTHROCYTE [DISTWIDTH] IN BLOOD BY AUTOMATED COUNT: 13 % (ref 11–15)
GLUCOSE SERPL-MCNC: 97 MG/DL (ref 65–99)
HCT VFR BLD AUTO: 53.6 % (ref 38.5–50)
HDLC SERPL-MCNC: 32 MG/DL
HGB BLD-MCNC: 17.2 G/DL (ref 13.2–17.1)
LDLC SERPL CALC-MCNC: 129 MG/DL (CALC)
MCH RBC QN AUTO: 28.6 PG (ref 27–33)
MCHC RBC AUTO-ENTMCNC: 32.1 G/DL (ref 32–36)
MCV RBC AUTO: 89.2 FL (ref 80–100)
NONHDLC SERPL-MCNC: 167 MG/DL (CALC)
PLATELET # BLD AUTO: 183 THOUSAND/UL (ref 140–400)
PMV BLD REES-ECKER: 11.4 FL (ref 7.5–12.5)
POTASSIUM SERPL-SCNC: 4.4 MMOL/L (ref 3.5–5.3)
PROT SERPL-MCNC: 7.2 G/DL (ref 6.1–8.1)
RBC # BLD AUTO: 6.01 MILLION/UL (ref 4.2–5.8)
SODIUM SERPL-SCNC: 140 MMOL/L (ref 135–146)
TRIGL SERPL-MCNC: 233 MG/DL
WBC # BLD AUTO: 5.7 THOUSAND/UL (ref 3.8–10.8)

## 2025-05-29 DIAGNOSIS — D58.2 ELEVATED HEMOGLOBIN: Primary | ICD-10-CM

## 2025-05-30 LAB
AMPHET UR-MCNC: 9079 NG/ML
AMPHETAMINES UR QL: POSITIVE NG/ML
BARBITURATES UR QL: NEGATIVE NG/ML
BENZODIAZ UR QL: NEGATIVE NG/ML
BZE UR QL: NEGATIVE NG/ML
CREAT UR-MCNC: 249.7 MG/DL
DRUG SCREEN COMMENT UR-IMP: ABNORMAL
METHADONE UR QL: NEGATIVE NG/ML
METHAMPHET UR-MCNC: NEGATIVE NG/ML
OPIATES UR QL: NEGATIVE NG/ML
OXIDANTS UR QL: NEGATIVE MCG/ML
OXYCODONE UR QL: NEGATIVE NG/ML
PCP UR QL: NEGATIVE NG/ML
PH UR: 5.3 [PH] (ref 4.5–9)
QUEST NOTES AND COMMENTS: ABNORMAL
THC UR QL: NEGATIVE NG/ML

## 2025-06-23 DIAGNOSIS — F90.0 ATTENTION DEFICIT HYPERACTIVITY DISORDER (ADHD), PREDOMINANTLY INATTENTIVE TYPE: ICD-10-CM

## 2025-06-24 RX ORDER — LISDEXAMFETAMINE DIMESYLATE 70 MG/1
70 CAPSULE ORAL EVERY MORNING
Qty: 30 CAPSULE | Refills: 0 | Status: SHIPPED | OUTPATIENT
Start: 2025-06-24 | End: 2025-07-24

## 2025-07-16 ENCOUNTER — APPOINTMENT (OUTPATIENT)
Dept: PRIMARY CARE | Facility: CLINIC | Age: 25
End: 2025-07-16
Payer: COMMERCIAL

## 2025-07-16 VITALS
WEIGHT: 227.2 LBS | DIASTOLIC BLOOD PRESSURE: 68 MMHG | HEART RATE: 87 BPM | BODY MASS INDEX: 30.77 KG/M2 | RESPIRATION RATE: 18 BRPM | TEMPERATURE: 97.5 F | OXYGEN SATURATION: 97 % | HEIGHT: 72 IN | SYSTOLIC BLOOD PRESSURE: 111 MMHG

## 2025-07-16 DIAGNOSIS — F90.0 ATTENTION DEFICIT HYPERACTIVITY DISORDER (ADHD), PREDOMINANTLY INATTENTIVE TYPE: ICD-10-CM
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
## 2025-07-16 DIAGNOSIS — E78.2 MIXED HYPERLIPIDEMIA: ICD-10-CM

## 2025-07-16 DIAGNOSIS — Z00.00 WELLNESS EXAMINATION: Primary | ICD-10-CM

## 2025-07-16 DIAGNOSIS — J45.40 MODERATE PERSISTENT ASTHMA WITHOUT COMPLICATION (HHS-HCC): ICD-10-CM

## 2025-07-16 DIAGNOSIS — K21.9 GASTROESOPHAGEAL REFLUX DISEASE WITHOUT ESOPHAGITIS: ICD-10-CM

## 2025-07-16 DIAGNOSIS — D68.51 HETEROZYGOUS FACTOR V LEIDEN MUTATION (MULTI): ICD-10-CM

## 2025-07-16 DIAGNOSIS — F41.9 ANXIETY: ICD-10-CM

## 2025-07-16 DIAGNOSIS — G43.809 OTHER MIGRAINE WITHOUT STATUS MIGRAINOSUS, NOT INTRACTABLE: ICD-10-CM

## 2025-07-16 PROCEDURE — 3008F BODY MASS INDEX DOCD: CPT | Performed by: FAMILY MEDICINE

## 2025-07-16 PROCEDURE — 99395 PREV VISIT EST AGE 18-39: CPT | Performed by: FAMILY MEDICINE

## 2025-07-16 PROCEDURE — 1036F TOBACCO NON-USER: CPT | Performed by: FAMILY MEDICINE

## 2025-07-16 RX ORDER — LISDEXAMFETAMINE DIMESYLATE 70 MG/1
70 CAPSULE ORAL EVERY MORNING
Qty: 30 CAPSULE | Refills: 0 | Status: SHIPPED | OUTPATIENT
Start: 2025-07-16 | End: 2025-08-15

## 2025-07-16 RX ORDER — LISDEXAMFETAMINE DIMESYLATE 70 MG/1
70 CAPSULE ORAL EVERY MORNING
Qty: 30 CAPSULE | Refills: 0 | Status: SHIPPED | OUTPATIENT
Start: 2025-08-15 | End: 2025-09-14

## 2025-07-16 RX ORDER — LISDEXAMFETAMINE DIMESYLATE 70 MG/1
70 CAPSULE ORAL EVERY MORNING
Qty: 30 CAPSULE | Refills: 0 | Status: SHIPPED | OUTPATIENT
Start: 2025-09-14 | End: 2025-10-14

## 2025-07-16 ASSESSMENT — ENCOUNTER SYMPTOMS
HEADACHES: 1
CARDIOVASCULAR NEGATIVE: 1
PSYCHIATRIC NEGATIVE: 1
GASTROINTESTINAL NEGATIVE: 1
MUSCULOSKELETAL NEGATIVE: 1
RESPIRATORY NEGATIVE: 1

## 2025-07-16 ASSESSMENT — PATIENT HEALTH QUESTIONNAIRE - PHQ9
2. FEELING DOWN, DEPRESSED OR HOPELESS: NOT AT ALL
SUM OF ALL RESPONSES TO PHQ9 QUESTIONS 1 AND 2: 0
1. LITTLE INTEREST OR PLEASURE IN DOING THINGS: NOT AT ALL
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
## 2025-07-16 NOTE — ASSESSMENT & PLAN NOTE
Lab Results   Component Value Date    CHOL 199 05/28/2025    CHOL 203 (H) 10/17/2024    CHOL 179 08/02/2023     Lab Results   Component Value Date    HDL 32 (L) 05/28/2025    HDL 29.3 10/17/2024    HDL 29.5 (A) 08/02/2023     Lab Results   Component Value Date    LDLCALC 129 (H) 05/28/2025     Lab Results   Component Value Date    TRIG 233 (H) 05/28/2025    TRIG 270 (H) 10/17/2024    TRIG 287 (H) 08/02/2023   Stable.  Advised to continue working on healthy lifestyle changes.  Starting to snack again, weight reflecting this.

## 2025-08-14 DIAGNOSIS — K21.9 GASTROESOPHAGEAL REFLUX DISEASE WITHOUT ESOPHAGITIS: ICD-10-CM

## 2025-08-14 DIAGNOSIS — G43.909 MIGRAINE WITHOUT STATUS MIGRAINOSUS, NOT INTRACTABLE, UNSPECIFIED MIGRAINE TYPE: ICD-10-CM

## 2025-08-14 DIAGNOSIS — J45.40 MODERATE PERSISTENT ASTHMA WITHOUT COMPLICATION (HHS-HCC): ICD-10-CM

## 2025-08-14 RX ORDER — MONTELUKAST SODIUM 10 MG/1
10 TABLET ORAL DAILY
Qty: 30 TABLET | Refills: 11 | Status: SHIPPED | OUTPATIENT
Start: 2025-08-14

## 2025-08-14 RX ORDER — CYPROHEPTADINE HYDROCHLORIDE 4 MG/1
4 TABLET ORAL 2 TIMES DAILY
Qty: 60 TABLET | Refills: 11 | Status: SHIPPED | OUTPATIENT
Start: 2025-08-14

## 2025-08-14 RX ORDER — OMEPRAZOLE 40 MG/1
40 CAPSULE, DELAYED RELEASE ORAL DAILY
Qty: 90 CAPSULE | Refills: 1 | Status: SHIPPED | OUTPATIENT
Start: 2025-08-14

## 2026-01-07 ENCOUNTER — APPOINTMENT (OUTPATIENT)
Dept: PRIMARY CARE | Facility: CLINIC | Age: 26
End: 2026-01-07
Payer: COMMERCIAL